# Patient Record
Sex: MALE | Race: BLACK OR AFRICAN AMERICAN
[De-identification: names, ages, dates, MRNs, and addresses within clinical notes are randomized per-mention and may not be internally consistent; named-entity substitution may affect disease eponyms.]

---

## 2018-01-05 ENCOUNTER — HOSPITAL ENCOUNTER (EMERGENCY)
Dept: HOSPITAL 45 - C.EDB | Age: 33
Discharge: HOME | End: 2018-01-05
Payer: COMMERCIAL

## 2018-01-05 VITALS — TEMPERATURE: 97.52 F

## 2018-01-05 VITALS
BODY MASS INDEX: 26.73 KG/M2 | BODY MASS INDEX: 26.73 KG/M2 | HEIGHT: 70.98 IN | WEIGHT: 190.92 LBS | WEIGHT: 190.92 LBS | HEIGHT: 70.98 IN

## 2018-01-05 VITALS — OXYGEN SATURATION: 97 % | DIASTOLIC BLOOD PRESSURE: 97 MMHG | HEART RATE: 67 BPM | SYSTOLIC BLOOD PRESSURE: 128 MMHG

## 2018-01-05 VITALS — OXYGEN SATURATION: 98 %

## 2018-01-05 DIAGNOSIS — Z86.718: ICD-10-CM

## 2018-01-05 DIAGNOSIS — F17.210: ICD-10-CM

## 2018-01-05 DIAGNOSIS — Z86.711: ICD-10-CM

## 2018-01-05 DIAGNOSIS — R55: Primary | ICD-10-CM

## 2018-01-05 DIAGNOSIS — Z79.01: ICD-10-CM

## 2018-01-05 LAB
ALBUMIN SERPL-MCNC: 3.8 GM/DL (ref 3.4–5)
ALP SERPL-CCNC: 129 U/L (ref 45–117)
ALT SERPL-CCNC: 42 U/L (ref 12–78)
AST SERPL-CCNC: 32 U/L (ref 15–37)
BASOPHILS # BLD: 0.03 K/UL (ref 0–0.2)
BASOPHILS NFR BLD: 0.4 %
BUN SERPL-MCNC: 20 MG/DL (ref 7–18)
CALCIUM SERPL-MCNC: 8.3 MG/DL (ref 8.5–10.1)
CK MB SERPL-MCNC: 2.9 NG/ML (ref 0.5–3.6)
CO2 SERPL-SCNC: 29 MMOL/L (ref 21–32)
CREAT SERPL-MCNC: 1.16 MG/DL (ref 0.6–1.4)
EOS ABS #: 0.2 K/UL (ref 0–0.5)
EOSINOPHIL NFR BLD AUTO: 239 K/UL (ref 130–400)
GLUCOSE SERPL-MCNC: 70 MG/DL (ref 70–99)
HCT VFR BLD CALC: 39.6 % (ref 42–52)
HGB BLD-MCNC: 14 G/DL (ref 14–18)
IG#: 0.03 K/UL (ref 0–0.02)
IMM GRANULOCYTES NFR BLD AUTO: 34.7 %
INR PPP: 2.7 (ref 0.9–1.1)
LYMPHOCYTES # BLD: 2.34 K/UL (ref 1.2–3.4)
MCH RBC QN AUTO: 30.4 PG (ref 25–34)
MCHC RBC AUTO-ENTMCNC: 35.4 G/DL (ref 32–36)
MCV RBC AUTO: 86.1 FL (ref 80–100)
MONO ABS #: 0.72 K/UL (ref 0.11–0.59)
MONOCYTES NFR BLD: 10.7 %
NEUT ABS #: 3.42 K/UL (ref 1.4–6.5)
NEUTROPHILS # BLD AUTO: 3 %
NEUTROPHILS NFR BLD AUTO: 50.8 %
PMV BLD AUTO: 9.3 FL (ref 7.4–10.4)
POTASSIUM SERPL-SCNC: 4.1 MMOL/L (ref 3.5–5.1)
PROT SERPL-MCNC: 7.4 GM/DL (ref 6.4–8.2)
RED CELL DISTRIBUTION WIDTH CV: 12.4 % (ref 11.5–14.5)
RED CELL DISTRIBUTION WIDTH SD: 39.4 FL (ref 36.4–46.3)
SODIUM SERPL-SCNC: 140 MMOL/L (ref 136–145)
WBC # BLD AUTO: 6.74 K/UL (ref 4.8–10.8)

## 2018-01-05 NOTE — EMERGENCY ROOM VISIT NOTE
History


Report prepared by Keiraibe:  Do Birch


Under the Supervision of:  Dr. Mando Gayle M.D.


First contact with patient:  19:04


Chief Complaint:  SYNCOPE


Stated Complaint:  DISORIENTED,PASSED OUT,TROUBLE BREATHING





History of Present Illness


The patient is a 32 year old male who presents to the Emergency Room with 

complaints of a syncopal episode that occurred earlier this evening. He states 

he was hanging out with friends [place] the [email or web address] 

this [place]. He went out to his car and reports he woke up "45 minutes later" 

unaware of what had happened. The patient states he started to feel dizzy and 

experienced abdominal pain before the episode occurred. He notes he did eat at 

[employer] earlier today. He denies sustaining and any injuries during the 

episode. He did experience some trouble breathing when he initially came too. 

He also complains of "hot flashes" in his arms for the past few weeks. The 

patient has a history of pulmonary embolism, found in August 2017, and is 

currently on Coumadin. He also complains of worsening anxiety recently and 

states he has been fatigued as he hasn't been sleeping well for the past few 

weeks.





   Source of History:  patient


   Onset:  PTA


   Position:  other (global)


   Timing:  resolved


   Associated Symptoms:  + LOC, + SOB, + abdominal pain, + fatigue





Review of Systems


See HPI for pertinent positives & negatives. A total of 10 systems reviewed and 

were otherwise negative.





Past Medical & Surgical


Medical Problems:


(1) Chest pain, pleuritic


(2) Pulmonary embolism


(3) Thrombosis in peripherally inserted central catheter (PICC)








Family History





FH: pulmonary embolism


Heart disease


Hypertension





Social History


Smoking Status:  Current Every Day Smoker


Alcohol Use:  heavy


Drug Use:  other


Marital Status:  in relationship


Housing Status:  lives with significant other


Occupation Status:  employed





Current/Historical Medications


Scheduled


Buprenorphine Hcl-Naloxone Hcl (Suboxone 8-2 Mg), 0.5 TAB PO DAILY


Gabapentin (Neurontin), 200 MG PO TID


Lactobacillus (Floranex), 4 TABS PO TID


Warfarin Sod (Coumadin), 5 MG PO DAILY@16





Allergies


Coded Allergies:  


     Fish (Verified  Allergy, Severe, ANAPHYLAXIS, 7/22/17)





Physical Exam


Vital Signs











  Date Time  Temp Pulse Resp B/P (MAP) Pulse Ox O2 Delivery O2 Flow Rate FiO2


 


1/5/18 20:09  67 20 128/97 97 Room Air  


 


1/5/18 19:29  67  144/84    





  71  143/89    





  72  141/87    


 


1/5/18 19:27     98 Room Air  


 


1/5/18 19:25  73      


 


1/5/18 18:55 36.4 70 18 145/95 100 Room Air  











Physical Exam


GENERAL: Patient is a healthy-appearing well-nourished 32 year old male 


HEAD: Normocephalic atraumatic


EYES: Ocular movements intact pupils equal and react to light


OROPHARYNX mucous membranes are moist no exudates present no erythema or edema 

present


NECK: Supple no nuchal rigidity


CHEST: Good equal expansion


LUNGS: Clear and equal to auscultation


CARDIAC: Normal S1 and S2


ABDOMEN: Soft nontender no guarding


BACK: No CVA tenderness


EXTREMITIES: No pain upon palpation normal muscle strength in all groups no 

clubbing cyanosis or edema


NEURO: Patient is following commands and answering questions appropriately. 

Alert and oriented x3 Cranial Nerves 2-12 grossly intact





Medical Decision & Procedures


ER Provider


Diagnostic Interpretation:


Radiology results as stated below per my review and radiologist interpretation:





CHEST ONE VIEW PORTABLE





CLINICAL HISTORY: Shortness of breath.    





COMPARISON STUDY:  Chest CT August 17, 2017 and chest radiograph August 25, 2017.





FINDINGS: Lung volumes are normal. No pneumothorax or pleural effusion is noted.


Pulmonary vascularity is normal. Cardiomediastinal silhouette is normal. No


consolidation is identified. 





IMPRESSION:  No acute cardiopulmonary findings. 





Electronically signed by:  Thor Hdez M.D.


1/5/2018 7:43 PM





Laboratory Results


1/5/18 19:23








Red Blood Count 4.60, Mean Corpuscular Volume 86.1, Mean Corpuscular Hemoglobin 

30.4, Mean Corpuscular Hemoglobin Concent 35.4, Mean Platelet Volume 9.3, 

Neutrophils (%) (Auto) 50.8, Lymphocytes (%) (Auto) 34.7, Monocytes (%) (Auto) 

10.7, Eosinophils (%) (Auto) 3.0, Basophils (%) (Auto) 0.4, Neutrophils # (Auto

) 3.42, Lymphocytes # (Auto) 2.34, Monocytes # (Auto) 0.72, Eosinophils # (Auto

) 0.20, Basophils # (Auto) 0.03





1/5/18 19:23

















Test


  1/5/18


19:21 1/5/18


19:23 1/5/18


19:46


 


Bedside Glucose


  83 mg/dl


(70-99) 


  


 


 


White Blood Count


  


  6.74 K/uL


(4.8-10.8) 


 


 


Red Blood Count


  


  4.60 M/uL


(4.7-6.1) 


 


 


Hemoglobin


  


  14.0 g/dL


(14.0-18.0) 


 


 


Hematocrit  39.6 % (42-52)  


 


Mean Corpuscular Volume


  


  86.1 fL


() 


 


 


Mean Corpuscular Hemoglobin


  


  30.4 pg


(25-34) 


 


 


Mean Corpuscular Hemoglobin


Concent 


  35.4 g/dl


(32-36) 


 


 


Platelet Count


  


  239 K/uL


(130-400) 


 


 


Mean Platelet Volume


  


  9.3 fL


(7.4-10.4) 


 


 


Neutrophils (%) (Auto)  50.8 %  


 


Lymphocytes (%) (Auto)  34.7 %  


 


Monocytes (%) (Auto)  10.7 %  


 


Eosinophils (%) (Auto)  3.0 %  


 


Basophils (%) (Auto)  0.4 %  


 


Neutrophils # (Auto)


  


  3.42 K/uL


(1.4-6.5) 


 


 


Lymphocytes # (Auto)


  


  2.34 K/uL


(1.2-3.4) 


 


 


Monocytes # (Auto)


  


  0.72 K/uL


(0.11-0.59) 


 


 


Eosinophils # (Auto)


  


  0.20 K/uL


(0-0.5) 


 


 


Basophils # (Auto)


  


  0.03 K/uL


(0-0.2) 


 


 


RDW Standard Deviation


  


  39.4 fL


(36.4-46.3) 


 


 


RDW Coefficient of Variation


  


  12.4 %


(11.5-14.5) 


 


 


Immature Granulocyte % (Auto)  0.4 %  


 


Immature Granulocyte # (Auto)


  


  0.03 K/uL


(0.00-0.02) 


 


 


Prothrombin Time


  


  28.1 SECONDS


(9.0-12.0) 


 


 


Prothromb Time International


Ratio 


  2.7 (0.9-1.1) 


  


 


 


Anion Gap


  


  5.0 mmol/L


(3-11) 


 


 


Est Creatinine Clear Calc


Drug Dose 


  95.5 ml/min 


  


 


 


Estimated GFR (


American) 


  94.7 


  


 


 


Estimated GFR (Non-


American 


  81.7 


  


 


 


BUN/Creatinine Ratio  16.9 (10-20)  


 


Calcium Level


  


  8.3 mg/dl


(8.5-10.1) 


 


 


Total Bilirubin


  


  0.3 mg/dl


(0.2-1) 


 


 


Direct Bilirubin


  


  < 0.1 mg/dl


(0-0.2) 


 


 


Aspartate Amino Transf


(AST/SGOT) 


  32 U/L (15-37) 


  


 


 


Alanine Aminotransferase


(ALT/SGPT) 


  42 U/L (12-78) 


  


 


 


Alkaline Phosphatase


  


  129 U/L


() 


 


 


Total Creatine Kinase


  


  498 U/L


() 


 


 


Creatine Kinase MB


  


  2.9 ng/ml


(0.5-3.6) 


 


 


Creatine Kinase MB Ratio  0.6 (0-3.0)  


 


Troponin I


  


  < 0.015 ng/ml


(0-0.045) 


 


 


Total Protein


  


  7.4 gm/dl


(6.4-8.2) 


 


 


Albumin


  


  3.8 gm/dl


(3.4-5.0) 


 


 


Thyroid Stimulating Hormone


(TSH) 


  1.710 uIu/ml


(0.300-4.500) 


 


 


Urine Color   YELLOW 


 


Urine Appearance   CLEAR (CLEAR) 


 


Urine pH   6.0 (4.5-7.5) 


 


Urine Specific Gravity


  


  


  1.020


(1.000-1.030)


 


Urine Protein   NEG (NEG) 


 


Urine Glucose (UA)   NEG (NEG) 


 


Urine Ketones   NEG (NEG) 


 


Urine Occult Blood   NEG (NEG) 


 


Urine Nitrite   NEG (NEG) 


 


Urine Bilirubin   NEG (NEG) 


 


Urine Urobilinogen   NEG (NEG) 


 


Urine Leukocyte Esterase   TRACE (NEG) 


 


Urine WBC (Auto)   1-5 /hpf (0-5) 


 


Urine RBC (Auto)   0-4 /hpf (0-4) 


 


Urine Hyaline Casts (Auto)   1-5 /lpf (0-5) 


 


Urine Epithelial Cells (Auto)


  


  


  5-10 /lpf


(0-5)


 


Urine Bacteria (Auto)   NEG (NEG) 





Labs reviewed by ED physician.





ECG


Indication:  syncope


Rate (beats per minute):  62


Rhythm:  normal sinus


Findings:  T-wave inversion (Inferior)


Change:  no significant change (No change from 8/17/17)





ED Course


1906: Past medical records reviewed. The patient was evaluated in room C9. A 

complete history and physical examination was performed. 





2009: I reevaluated the patient. He is resting comfortably. I discussed his 

results and discharge instructions and he verbalized complete understanding and 

agreement.





Medical Decision


Prior records/ancillary studies reviewed.


Triage Nursing notes reviewed.





The patient's history was concerning for syncope.





Differential diagnosis:


Etiologies such as vasovagal event, infection, hypoglycemia, electrolyte 

abnormalities, cardiac sources, intracerebral event, toxicologic, neurologic, 

as well as others were entertained.





This is a 32-year-old male who presents emergency department complaining of 

shortness of a syncopal episode.  The patient has a history of PE however he 

denies any chest pain.  In addition he is not tachycardic.  His INR level is 

therapeutic at 2.7.  Based on these findings my suspicion of a PE is 

exceedingly low.  The patient feels that he has had an anxiety attack.  This 

would certainly fit with his complaints of numbness and tingling in his arms 

and legs.  His normal CBC normal renal profile normal liver profile.  His EKG 

is unchanged from previous.  Based on these findings I feel the patient can be 

safely discharged home for follow-up with his primary care physician.  Patient 

was in agreement with the treatment plan.





Medication Reconcilliation


Current Medication List:  was personally reviewed by me





Blood Pressure Screening


Patient's blood pressure:  Elevated blood pressure


Blood pressure disposition:  Referred to PCP





Impression





 Primary Impression:  


 Vasovagal syncope


 Additional Impression:  


 Syncope





Scribe Attestation


The scribe's documentation has been prepared under my direction and personally 

reviewed by me in its entirety. I confirm that the note above accurately 

reflects all work, treatment, procedures, and medical decision making performed 

by me.





Departure Information


Dispostion


Home / Self-Care





Referrals


No Doctor, Assigned (PCP)





Patient Instructions


Anxiety Body Response, ED Syncope Vasovagal, My [facility]





Additional Instructions





Increase fluids next 48 hours














You have been examined and treated today on an emergency basis only. This is 

not a substitute for, or an effort to provide, complete comprehensive medical 

care. It is impossible to recognize and treat all injuries or illnesses in a 

single emergency department visit. It is therefore important that you follow up 

closely with your PCP.  Call as soon as possible for an appointment.  





Thank you for your time and consideration.  I look forward to speaking with you 

again soon.  Please don't hesitate to call us if you have any questions.





Problem Qualifiers








 Additional Impression:  


 Syncope


 Syncope type:  vasovagal syncope  Qualified Codes:  R55 - Syncope and collapse

## 2018-01-05 NOTE — DIAGNOSTIC IMAGING REPORT
CHEST ONE VIEW PORTABLE



CLINICAL HISTORY: Shortness of breath.    



COMPARISON STUDY:  Chest CT August 17, 2017 and chest radiograph August 25, 2017.



FINDINGS: Lung volumes are normal. No pneumothorax or pleural effusion is noted.

Pulmonary vascularity is normal. Cardiomediastinal silhouette is normal. No

consolidation is identified. 



IMPRESSION:  No acute cardiopulmonary findings. 









Electronically signed by:  Thor Hdez M.D.

1/5/2018 7:43 PM



Dictated Date/Time:  1/5/2018 7:42 PM

## 2018-01-13 ENCOUNTER — HOSPITAL ENCOUNTER (EMERGENCY)
Dept: HOSPITAL 45 - C.EDB | Age: 33
Discharge: HOME | End: 2018-01-13
Payer: COMMERCIAL

## 2018-01-13 VITALS — OXYGEN SATURATION: 99 %

## 2018-01-13 VITALS
BODY MASS INDEX: 27.31 KG/M2 | HEIGHT: 70.98 IN | WEIGHT: 195.11 LBS | HEIGHT: 70.98 IN | BODY MASS INDEX: 27.31 KG/M2 | WEIGHT: 195.11 LBS

## 2018-01-13 VITALS — DIASTOLIC BLOOD PRESSURE: 85 MMHG | HEART RATE: 71 BPM | SYSTOLIC BLOOD PRESSURE: 128 MMHG | OXYGEN SATURATION: 100 %

## 2018-01-13 VITALS — TEMPERATURE: 97.88 F

## 2018-01-13 DIAGNOSIS — Z86.718: ICD-10-CM

## 2018-01-13 DIAGNOSIS — J18.1: Primary | ICD-10-CM

## 2018-01-13 DIAGNOSIS — Z79.899: ICD-10-CM

## 2018-01-13 DIAGNOSIS — F17.210: ICD-10-CM

## 2018-01-13 DIAGNOSIS — Z86.711: ICD-10-CM

## 2018-01-13 DIAGNOSIS — R06.02: ICD-10-CM

## 2018-01-13 DIAGNOSIS — Z79.01: ICD-10-CM

## 2018-01-13 DIAGNOSIS — Z82.49: ICD-10-CM

## 2018-01-13 LAB
ALBUMIN SERPL-MCNC: 3.8 GM/DL (ref 3.4–5)
ALP SERPL-CCNC: 136 U/L (ref 45–117)
ALT SERPL-CCNC: 51 U/L (ref 12–78)
AST SERPL-CCNC: 32 U/L (ref 15–37)
BASOPHILS # BLD: 0.02 K/UL (ref 0–0.2)
BASOPHILS NFR BLD: 0.2 %
BUN SERPL-MCNC: 13 MG/DL (ref 7–18)
CALCIUM SERPL-MCNC: 8.6 MG/DL (ref 8.5–10.1)
CO2 SERPL-SCNC: 27 MMOL/L (ref 21–32)
CREAT SERPL-MCNC: 1.01 MG/DL (ref 0.6–1.4)
EOS ABS #: 0.21 K/UL (ref 0–0.5)
EOSINOPHIL NFR BLD AUTO: 248 K/UL (ref 130–400)
GLUCOSE SERPL-MCNC: 105 MG/DL (ref 70–99)
HCT VFR BLD CALC: 37.9 % (ref 42–52)
HGB BLD-MCNC: 13.8 G/DL (ref 14–18)
IG#: 0.03 K/UL (ref 0–0.02)
IMM GRANULOCYTES NFR BLD AUTO: 16.9 %
INR PPP: 3.4 (ref 0.9–1.1)
LYMPHOCYTES # BLD: 2.11 K/UL (ref 1.2–3.4)
MCH RBC QN AUTO: 31 PG (ref 25–34)
MCHC RBC AUTO-ENTMCNC: 36.4 G/DL (ref 32–36)
MCV RBC AUTO: 85.2 FL (ref 80–100)
MONO ABS #: 1.68 K/UL (ref 0.11–0.59)
MONOCYTES NFR BLD: 13.5 %
NEUT ABS #: 8.43 K/UL (ref 1.4–6.5)
NEUTROPHILS # BLD AUTO: 1.7 %
NEUTROPHILS NFR BLD AUTO: 67.5 %
PMV BLD AUTO: 9.7 FL (ref 7.4–10.4)
POTASSIUM SERPL-SCNC: 3.8 MMOL/L (ref 3.5–5.1)
PROT SERPL-MCNC: 7.4 GM/DL (ref 6.4–8.2)
PTT PATIENT: 43.1 SECONDS (ref 21–31)
RED CELL DISTRIBUTION WIDTH CV: 12.4 % (ref 11.5–14.5)
RED CELL DISTRIBUTION WIDTH SD: 38.5 FL (ref 36.4–46.3)
SODIUM SERPL-SCNC: 140 MMOL/L (ref 136–145)
WBC # BLD AUTO: 12.48 K/UL (ref 4.8–10.8)

## 2018-01-13 NOTE — DIAGNOSTIC IMAGING REPORT
(CHEST FOR PE) ANGIO WITH



CT DOSE: 360.00 mGy.cm



HISTORY: Chest pain  dyspnea



TECHNIQUE: Multiaxial CT images of the chest were performed following the

intravenous administration of contrast to evaluate the pulmonary arteries.

Maximal intensity projection images were also obtained.  A dose lowering

technique was utilized adhering to the principles of ALARA.





COMPARISON STUDY: 8/17/2017



FINDINGS: There is a normal caliber thoracic aorta with no evidence for

dissection. There is no evidence for pulmonary embolus. No pleural effusions. No

pneumothorax. The liver and spleen are unremarkable. No mediastinal or hilar

lymphadenopathy. The central airways are patent. The lungs demonstrate minimal

interstitial infiltrative change right pulmonary apex.



IMPRESSION: 

No evidence for pulmonary embolus. Minimal interstitial infiltrate right

pulmonary apex.











The above report was generated using voice recognition software.  It may contain

grammatical, syntax or spelling errors.







Electronically signed by:  Sae Tello M.D.

1/13/2018 3:26 PM



Dictated Date/Time:  1/13/2018 3:21 PM

## 2018-01-13 NOTE — DIAGNOSTIC IMAGING REPORT
CHEST ONE VIEW PORTABLE



CLINICAL HISTORY: EVALUATE RESPIRATORY DISTRESS.DYSPNEA dyspnea



COMPARISON STUDY:  1/5/2018



FINDINGS: The bones soft tissues and hemidiaphragms are normal. The

cardiomediastinal silhouette is normal. The lungs are clear. The pulmonary

vasculature is normal. 



IMPRESSION:  Negative chest. 











The above report was generated using voice recognition software.  It may contain

grammatical, syntax or spelling errors.









Electronically signed by:  Sae Tello M.D.

1/13/2018 1:22 PM



Dictated Date/Time:  1/13/2018 1:22 PM

## 2018-01-13 NOTE — DIAGNOSTIC IMAGING REPORT
VENOUS DOPPLER LWR EXT BILA



HISTORY: Pain. Edema.  h/o PE, SOB, mild b/l LE swelling at ankles



COMPARISON STUDY:  7/27/2017



FINDINGS: There is normal compressibility, flow, and augmentation within the

bilateral lower extremity deep venous systems.



IMPRESSION:  

No DVT within the right or left lower extremity.









The above report was generated using voice recognition software.  It may contain

grammatical, syntax or spelling errors.







Electronically signed by:  Sae Tello M.D.

1/13/2018 3:14 PM



Dictated Date/Time:  1/13/2018 3:11 PM

## 2018-01-13 NOTE — EMERGENCY ROOM VISIT NOTE
History


Report prepared by Osiris:  Brenda Monk


Under the Supervision of:  Dr. Emilio Solo M.D.


First contact with patient:  12:43


Chief Complaint:  OTHER COMPLAINT


Stated Complaint:  PULMONARY EMBOLISM,PT HAS HX OF,SWOLLEN FEET





History of Present Illness


The patient is a 32 year old black male with a past medical history of PE and 

anxiety who presents to the ED with a cc of leg pain beginning 8 days ago. The 

patient states that he was admitted to the hospital recently for a PE. He 

reports that he has been taking Coumadin since his visit. Over the past few 

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 

cleaning. Positive shortness of breath, left arm pain, foot swelling and foot 

pain. Negative recent trauma. The patient states that he has not been sleeping 

well in the past 2 days. He reports that he is a smoker and drank last over 6 

months ago.





   Source of History:  patient


   Onset:  8 days ago


   Position:  leg


   Timing:  constant


   Associated Symptoms:  + SOB


Note:


Positive foot swelling and pain. Negative trauma.





Review of Systems


See HPI for pertinent positives and negatives.  A total of ten systems were 

reviewed and were otherwise negative.





Past Medical & Surgical


Medical Problems:


(1) Chest pain, pleuritic


(2) Pulmonary embolism


(3) Thrombosis in peripherally inserted central catheter (PICC)








Family History





FH: pulmonary embolism


Heart disease


Hypertension





Social History


Smoking Status:  Current Every Day Smoker


Alcohol Use:  heavy


Drug Use:  other


Marital Status:  in relationship


Housing Status:  lives with significant other


Occupation Status:  employed





Current/Historical Medications


Scheduled


Azithromycin (Zithromax), 250 MG PO DAILY


Buprenorphine Hcl-Naloxone Hcl (Suboxone 8-2 Mg), 0.5 DOSE SL DAILY


Gabapentin (Neurontin), 200 MG PO TID


Lactobacillus (Floranex), 4 TABS PO TID


Warfarin Sod (Coumadin), 5 MG PO DAILY@16





Allergies


Coded Allergies:  


     Shellfish (Verified  Allergy, Unknown, ANAPHYLAXIS, 1/13/18)





Physical Exam


Vital Signs











  Date Time  Temp Pulse Resp B/P (MAP) Pulse Ox O2 Delivery O2 Flow Rate FiO2


 


1/13/18 15:30  71 15 128/85 100 Room Air  


 


1/13/18 14:39  78 14 128/77 97 Room Air  


 


1/13/18 13:43  89      


 


1/13/18 13:39  98 17 142/76 99 Room Air  


 


1/13/18 13:39     99 Room Air  


 


1/13/18 12:30 36.6 108 20 149/83 100 Room Air  











Physical Exam


GENERAL: Awake, alert, well-appearing, NAD


HENT: Normocephalic, atraumatic.


EYES: Normal conjunctiva. Sclera non-icteric.


NECK: Supple. No nuchal rigidity. FROM.


RESPIRATORY: CTAB, no rhonchi, wheezing, crackles


CARDIAC: RRR, no MRG


ABDOMEN: Soft, NTND, BS+


MSK: No chest wall TTP,  non pitting edema around the ankles, no warmth noted, 

compartments soft. NVI distally


NEURO: GCS 15, CN 2-12 intact, moves all 4s on command


SKIN: No rash or jaundice noted.





Medical Decision & Procedures


ER Provider


Diagnostic Interpretation:


Radiology results as stated below per my review and radiologist interpretation: 





VENOUS DOPPLER LWR EXT BILA





IMPRESSION:  


No DVT within the right or left lower extremity.





The above report was generated using voice recognition software.  It may contain


grammatical, syntax or spelling errors





Electronically signed by:  Sae Tello M.D.


1/13/2018 3:14 PM





Dictated Date/Time:  1/13/2018 3:11 PM





(CHEST FOR PE) ANGIO WITH





COMPARISON STUDY: 8/17/2017





FINDINGS: There is a normal caliber thoracic aorta with no evidence for


dissection. There is no evidence for pulmonary embolus. No pleural effusions. No


pneumothorax. The liver and spleen are unremarkable. No mediastinal or hilar


lymphadenopathy. The central airways are patent. The lungs demonstrate minimal


interstitial infiltrative change right pulmonary apex.





IMPRESSION: 


No evidence for pulmonary embolus. Minimal interstitial infiltrate right


pulmonary apex.





The above report was generated using voice recognition software.  It may contain


grammatical, syntax or spelling errors.





Electronically signed by:  Sae Tello M.D.


1/13/2018 3:26 PM





Dictated Date/Time:  1/13/2018 3:21 PM





CHEST ONE VIEW PORTABLE





FINDINGS: The bones soft tissues and hemidiaphragms are normal. The


cardiomediastinal silhouette is normal. The lungs are clear. The pulmonary


vasculature is normal. 





IMPRESSION:  Negative chest. 





The above report was generated using voice recognition software.  It may contain


grammatical, syntax or spelling errors.





Electronically signed by:  Sae Tello M.D.


1/13/2018 1:22 PM





Dictated Date/Time:  1/13/2018 1:22 PM





Laboratory Results


1/13/18 12:45








Red Blood Count 4.45, Mean Corpuscular Volume 85.2, Mean Corpuscular Hemoglobin 

31.0, Mean Corpuscular Hemoglobin Concent 36.4, Mean Platelet Volume 9.7, 

Neutrophils (%) (Auto) 67.5, Lymphocytes (%) (Auto) 16.9, Monocytes (%) (Auto) 

13.5, Eosinophils (%) (Auto) 1.7, Basophils (%) (Auto) 0.2, Neutrophils # (Auto

) 8.43, Lymphocytes # (Auto) 2.11, Monocytes # (Auto) 1.68, Eosinophils # (Auto

) 0.21, Basophils # (Auto) 0.02





1/13/18 12:45

















Test


  1/13/18


12:45


 


White Blood Count


  12.48 K/uL


(4.8-10.8)


 


Red Blood Count


  4.45 M/uL


(4.7-6.1)


 


Hemoglobin


  13.8 g/dL


(14.0-18.0)


 


Hematocrit 37.9 % (42-52) 


 


Mean Corpuscular Volume


  85.2 fL


()


 


Mean Corpuscular Hemoglobin


  31.0 pg


(25-34)


 


Mean Corpuscular Hemoglobin


Concent 36.4 g/dl


(32-36)


 


Platelet Count


  248 K/uL


(130-400)


 


Mean Platelet Volume


  9.7 fL


(7.4-10.4)


 


Neutrophils (%) (Auto) 67.5 % 


 


Lymphocytes (%) (Auto) 16.9 % 


 


Monocytes (%) (Auto) 13.5 % 


 


Eosinophils (%) (Auto) 1.7 % 


 


Basophils (%) (Auto) 0.2 % 


 


Neutrophils # (Auto)


  8.43 K/uL


(1.4-6.5)


 


Lymphocytes # (Auto)


  2.11 K/uL


(1.2-3.4)


 


Monocytes # (Auto)


  1.68 K/uL


(0.11-0.59)


 


Eosinophils # (Auto)


  0.21 K/uL


(0-0.5)


 


Basophils # (Auto)


  0.02 K/uL


(0-0.2)


 


RDW Standard Deviation


  38.5 fL


(36.4-46.3)


 


RDW Coefficient of Variation


  12.4 %


(11.5-14.5)


 


Immature Granulocyte % (Auto) 0.2 % 


 


Immature Granulocyte # (Auto)


  0.03 K/uL


(0.00-0.02)


 


Prothrombin Time


  35.3 SECONDS


(9.0-12.0)


 


Prothromb Time International


Ratio 3.4 (0.9-1.1) 


 


 


Activated Partial


Thromboplast Time 43.1 SECONDS


(21.0-31.0)


 


Partial Thromboplastin Ratio 1.7 


 


Anion Gap


  7.0 mmol/L


(3-11)


 


Est Creatinine Clear Calc


Drug Dose 109.7 ml/min 


 


 


Estimated GFR (


American) 112.0 


 


 


Estimated GFR (Non-


American 96.6 


 


 


BUN/Creatinine Ratio 12.9 (10-20) 


 


Calcium Level


  8.6 mg/dl


(8.5-10.1)


 


Total Bilirubin


  0.3 mg/dl


(0.2-1)


 


Aspartate Amino Transf


(AST/SGOT) 32 U/L (15-37) 


 


 


Alanine Aminotransferase


(ALT/SGPT) 51 U/L (12-78) 


 


 


Alkaline Phosphatase


  136 U/L


()


 


Troponin I


  < 0.015 ng/ml


(0-0.045)


 


Pro-B-Type Natriuretic Peptide


  7 pg/ml


(0-450)


 


Total Protein


  7.4 gm/dl


(6.4-8.2)


 


Albumin


  3.8 gm/dl


(3.4-5.0)


 


Globulin


  3.6 gm/dl


(2.5-4.0)


 


Albumin/Globulin Ratio 1.0 (0.9-2) 





Laboratory results reviewed by me





Medications Administered











 Medications


  (Trade)  Dose


 Ordered  Sig/Jae


 Route  Start Time


 Stop Time Status Last Admin


Dose Admin


 


 Azithromycin


  (Zithromax Tab)  500 mg  NOW  ONCE


 PO  1/13/18 15:45


 1/13/18 15:46 DC 1/13/18 15:59


500 MG











ECG


Indication:  SOB/dyspnea


Rate (beats per minute):  92


Rhythm:  normal sinus


Findings:  T-wave inversion (lead 3), other (normal axis, normal intervals, no 

other TWI or STS changes)





ED Course


1243: The patient was evaluated in room B3. A complete history and physical 

exam was performed.





1602: I reevaluated and updated the patient. The patient is going to discuss 

his anxiety with a  before going home. 





I reevaluated the patient. Discussed results and discharge instructions: He 

verbalized understanding and agreement. The patient is ready for discharge.





Medical Decision


The patient is a 32 year old black male with a past medical history of PE and 

anxiety who presents to the ED with a cc of leg pain beginning 8 days ago. 





Differential diagnosis:


Etiologies such as anxiety, infections, reactive airway disease, pneumonia, 

pneumothorax, COPD, CHF, cardiac ischemia, pulmonary embolism, musculoskeletal, 

gastrointestinal, as well as others were entertained.





Patient seen and evaluated at the bedside.  Patient is a prior history of PE 

and DVTs.  Also is a prior history of alcohol in drug abuse in the past.  

Patient denies any recent alcohol or drug abuse.  States he has not had a drink 

in 6 months.  Patient was admitted for PE discharge on Coumadin back in August.

  Patient states been compliant with his medications.  Patient has noticed some 

bilateral lower extremity swelling as well as some shortness of breath.  

Patient denies any cough.  Of note patient is a smoker.  Patient denies any 

hemoptysis or recent prolonged car or plane travel.  Patient did have blood work

, EKG, troponin, BNP, CT PE.  Patient's EKG nonischemic without noted right 

heart strain.  Patient troponin negative.  BNP is not elevated.  Patient chest x

-ray clear. B/l LE dopplers performed. 





Patient's CT PE or PE protocol negative for PEs.  Patient does have crushable 

interstitial infiltrate in the right apex.  Patient was given azithromycin.  

Bilateral lower extremity Dopplers were negative for acute DVT.  Patient's 

other blood work was fairly unremarkable.  Given the patient's white blood cell 

count of 12,000 with his interstitial infiltrate patient was given antibiotics.

  Patient was counseled on smoking cessation.  Patient was having some anxiety 

patient was given some outpatient resources via the site .  Patient 

was deemed suitable for outpatient follow-up and treatment at this time.Patient 

was given strict follow-up, discharge, and return precautions.  All questions 

were answered.  Patient was deemed suitable for outpatient follow-up at this 

time.  Patient agreed with the plan of care and was safely discharged home.





The chart was completed utilizing Dragon Speech voice recognition software. 

Grammatical errors, random word insertions, pronoun errors, and incomplete 

sentences are an occasional consequence of this system due to software 

limitations, ambient noise, and hardware issues. Any formal questions or 

concerns about the content, text, or information contained within the body of 

this dictation should be directly addressed to the physician for clarification.





Medication Reconcilliation


Current Medication List:  was personally reviewed by me





Blood Pressure Screening


Patient's blood pressure:  Elevated blood pressure


Blood pressure disposition:  Elevated BP felt to be situational





Impression





 Primary Impression:  


 Pneumonia


 Additional Impression:  


 SOB (shortness of breath)





Scribe Attestation


The scribe's documentation has been prepared under my direction and personally 

reviewed by me in its entirety. I confirm that the note above accurately 

reflects all work, treatment, procedures, and medical decision making performed 

by me.





Departure Information


Dispostion


Home / Self-Care





Prescriptions





Azithromycin (Zithromax) 250 Mg Tab


250 MG PO DAILY for 4 Days, #4 TAB


   Prov: Emilio Solo M.D.         1/13/18





Referrals


No Doctor, Assigned (PCP)





Patient Instructions


ED Smoking Cessation, [facility], Pneumonia





Additional Instructions





Please return to the emergency department if you have worsening or recurrent 

symptoms not amenable to at-home treatment.  Please call for a follow-up 

appointment with her primary care physician.  Please take your medications as 

prescribed.  If you have other concerns and/or complaints please feel free to 

also call your primary care physician's office or return the ED for further 

evaluation, management, and treatment.





You may take tylenol 1000 mg every 6 hours as needed for pain.





Consider smoking cessation.





Take your medications as prescribed. If taking an antibiotic consider taking a 

probiotic and/or eating yogurt, but at the least, please take with food as it 

can cause upset stomach.





Please take your medications as prescribed.  Please also follow up with the 

Coumadin clinic to make sure that you're Coumadin levels are normal.  They were 

mildly elevated today at 3.4.  If he did take 10 mg per day please try taking 

7.5 mg for your next dose.





You have been examined and treated today on an emergency basis only. This is 

not a substitute for, or an effort to provide, complete comprehensive medical 

care. It is impossible to recognize and treat all injuries or illnesses in a 

single emergency department visit. It is therefore important that you follow up 

closely with [facility], your PCP, and/or your specialist(s). 

Call as soon as possible for an appointment.





Thank you for your time and consideration. I look forward to speaking with you 

again soon. Please don't hesitate to call us if you have any questions.





Problem Qualifiers








 Primary Impression:  


 Pneumonia


 Pneumonia type:  due to unspecified organism  Laterality:  right  Lung location

:  upper lobe of lung  Qualified Codes:  J18.1 - Lobar pneumonia, unspecified 

organism

## 2018-04-15 ENCOUNTER — HOSPITAL ENCOUNTER (EMERGENCY)
Dept: HOSPITAL 45 - C.EDB | Age: 33
Discharge: HOME | End: 2018-04-15
Payer: COMMERCIAL

## 2018-04-15 VITALS
WEIGHT: 209.22 LBS | BODY MASS INDEX: 29.29 KG/M2 | BODY MASS INDEX: 29.29 KG/M2 | WEIGHT: 209.22 LBS | HEIGHT: 70.98 IN | HEIGHT: 70.98 IN

## 2018-04-15 VITALS — OXYGEN SATURATION: 95 % | SYSTOLIC BLOOD PRESSURE: 145 MMHG | DIASTOLIC BLOOD PRESSURE: 87 MMHG | HEART RATE: 91 BPM

## 2018-04-15 VITALS — TEMPERATURE: 98.6 F

## 2018-04-15 DIAGNOSIS — R60.0: ICD-10-CM

## 2018-04-15 DIAGNOSIS — F17.200: ICD-10-CM

## 2018-04-15 DIAGNOSIS — Z91.013: ICD-10-CM

## 2018-04-15 DIAGNOSIS — Z79.01: ICD-10-CM

## 2018-04-15 DIAGNOSIS — T85.868A: ICD-10-CM

## 2018-04-15 DIAGNOSIS — E86.0: Primary | ICD-10-CM

## 2018-04-15 DIAGNOSIS — Z51.81: ICD-10-CM

## 2018-04-15 LAB
ALBUMIN SERPL-MCNC: 3.6 GM/DL (ref 3.4–5)
ALP SERPL-CCNC: 158 U/L (ref 45–117)
ALT SERPL-CCNC: 64 U/L (ref 12–78)
AST SERPL-CCNC: 61 U/L (ref 15–37)
BASOPHILS # BLD: 0.01 K/UL (ref 0–0.2)
BASOPHILS NFR BLD: 0.1 %
BUN SERPL-MCNC: 12 MG/DL (ref 7–18)
CALCIUM SERPL-MCNC: 8.5 MG/DL (ref 8.5–10.1)
CO2 SERPL-SCNC: 28 MMOL/L (ref 21–32)
CREAT SERPL-MCNC: 1.18 MG/DL (ref 0.6–1.4)
EOS ABS #: 0.09 K/UL (ref 0–0.5)
EOSINOPHIL NFR BLD AUTO: 211 K/UL (ref 130–400)
GLUCOSE SERPL-MCNC: 104 MG/DL (ref 70–99)
HCT VFR BLD CALC: 37.1 % (ref 42–52)
HGB BLD-MCNC: 13.5 G/DL (ref 14–18)
IG#: 0.04 K/UL (ref 0–0.02)
IMM GRANULOCYTES NFR BLD AUTO: 12.7 %
INR PPP: 1.5 (ref 0.9–1.1)
LIPASE: 71 U/L (ref 73–393)
LYMPHOCYTES # BLD: 1.16 K/UL (ref 1.2–3.4)
MCH RBC QN AUTO: 30.9 PG (ref 25–34)
MCHC RBC AUTO-ENTMCNC: 36.4 G/DL (ref 32–36)
MCV RBC AUTO: 84.9 FL (ref 80–100)
MONO ABS #: 1.36 K/UL (ref 0.11–0.59)
MONOCYTES NFR BLD: 14.9 %
NEUT ABS #: 6.46 K/UL (ref 1.4–6.5)
NEUTROPHILS # BLD AUTO: 1 %
NEUTROPHILS NFR BLD AUTO: 70.9 %
PMV BLD AUTO: 9 FL (ref 7.4–10.4)
POTASSIUM SERPL-SCNC: 3.8 MMOL/L (ref 3.5–5.1)
PROT SERPL-MCNC: 7.3 GM/DL (ref 6.4–8.2)
PTT PATIENT: 36.9 SECONDS (ref 21–31)
RED CELL DISTRIBUTION WIDTH CV: 11.6 % (ref 11.5–14.5)
RED CELL DISTRIBUTION WIDTH SD: 36.3 FL (ref 36.4–46.3)
SODIUM SERPL-SCNC: 138 MMOL/L (ref 136–145)
WBC # BLD AUTO: 9.12 K/UL (ref 4.8–10.8)

## 2018-04-15 NOTE — EMERGENCY ROOM VISIT NOTE
History


Report prepared by Osiris:  Siva Ulloa


Under the Supervision of:  Dr. Emilio Solo M.D.


First contact with patient:  18:03


Chief Complaint:  PAIN (GENERALIZED)


Stated Complaint:  COULNT MOVE, PAIN IN ARM, FOOT TO THIGH SWOLLEN





History of Present Illness


The patient is a 32 year old black male with a past medical history of 

pulmonary embolisms who presents to the ED with a cc of worsening swelling to 

his lower extremities beginning three months ago. Pt states his swelling 

started in his bilateral feet and has now moved up his legs three days ago. 

Positive shortness of breath, right lower back pain that woke him up this 

morning, constant bloating, nausea, short term memory problems, and taking 

Coumadin. Negative vomiting and missing a dose of his Coumadin. Review of EMR 

showed the patient was evaluated three months ago and had a negative CT and 

bilateral lower extremity Doppler ultrasounds. It reports he was also evaluated 

for foot and ankle swelling a month ago and had another negative Doppler 

ultrasound. EMR notes he is being treated for cellulitis.





   Source of History:  patient


   Onset:  three months ago


   Position:  other (bilateral lower extremities)


   Quality:  other (swelling)


   Timing:  worsening


   Associated Symptoms:  + SOB, + nausea, + back pain (right lower), No vomiting


Note:


Associated symptoms: constant bloating, short term memory problems





Review of Systems


See HPI for pertinent positives and negatives.  A total of ten systems were 

reviewed and were otherwise negative.





Past Medical & Surgical


Medical Problems:


(1) Chest pain, pleuritic


(2) Pulmonary embolism


(3) Thrombosis in peripherally inserted central catheter (PICC)








Family History





FH: pulmonary embolism


Heart disease


Hypertension





Social History


Smoking Status:  Current Every Day Smoker


Alcohol Use:  heavy


Drug Use:  other


Marital Status:  in relationship


Housing Status:  lives with significant other


Occupation Status:  employed





Current/Historical Medications


Scheduled


Amphetamine-Dextroamphetamine 20MG (Adderall 20MG), 20 MG PO DAILY


Buprenorphine Hcl-Naloxone Hcl (Suboxone 8-2 Mg), Unknown Dose SL DAILY


Escitalopram (Lexapro), 10 MG PO DAILY


Gabapentin (Neurontin), 200 MG PO TID


Probiotic Product (Probiotic), 1 CAP PO TID


Warfarin Sodium (Coumadin), 5 MG PO DAILY AT 1600





Allergies


Coded Allergies:  


     Shellfish (Verified  Allergy, Severe, ANAPHYLAXIS, 4/15/18)





Physical Exam


Vital Signs











  Date Time  Temp Pulse Resp B/P (MAP) Pulse Ox O2 Delivery O2 Flow Rate FiO2


 


4/15/18 20:00  91 20 145/87 95 Room Air  


 


4/15/18 18:30  109 17 137/78 99 Room Air  


 


4/15/18 18:14      Room Air  


 


4/15/18 18:13  119      


 


4/15/18 17:58 37.0 125 20 141/80 97 Room Air  











Physical Exam


GENERAL: Awake, alert, well-appearing, NAD


HENT: Normocephalic, atraumatic.


EYES: Normal conjunctiva. Sclera non-icteric.


NECK: Supple. No nuchal rigidity. FROM.


RESPIRATORY: CTAB, no rhonchi, wheezing, crackles


CARDIAC: Tachy and regular, no MRG


ABDOMEN: Soft, ND, BS+, reproducible right sided abdominal pain.


MSK: No chest wall TTP, 1+ bilateral LE edema up to the mild chin.


NEURO: GCS 15, CN 2-12 intact, moves all 4s on command


SKIN: No rash or jaundice noted.





Medical Decision & Procedures


ER Provider


Diagnostic Interpretation:


X-ray: Per my interpretation, radiologist review. 





CHEST ONE VIEW PORTABLE





CLINICAL HISTORY: 32 years-old Male presenting with CHEST PAIN, swollen leg. 





TECHNIQUE: Portable upright AP view of the chest was obtained.





COMPARISON: 1/13/2018.





FINDINGS:


Cardiomediastinal silhouette normal. Lungs and pleural spaces clear. Osseous


structures normal. Upper abdomen normal.





IMPRESSION:


1.  No acute cardiopulmonary disease.





Electronically signed by:  Kurt Nam M.D.


4/15/2018 6:47 PM





Dictated Date/Time:  4/15/2018 6:46 PM





Laboratory Results


4/15/18 18:14








Red Blood Count 4.37, Mean Corpuscular Volume 84.9, Mean Corpuscular Hemoglobin 

30.9, Mean Corpuscular Hemoglobin Concent 36.4, Mean Platelet Volume 9.0, 

Neutrophils (%) (Auto) 70.9, Lymphocytes (%) (Auto) 12.7, Monocytes (%) (Auto) 

14.9, Eosinophils (%) (Auto) 1.0, Basophils (%) (Auto) 0.1, Neutrophils # (Auto

) 6.46, Lymphocytes # (Auto) 1.16, Monocytes # (Auto) 1.36, Eosinophils # (Auto

) 0.09, Basophils # (Auto) 0.01





4/15/18 18:14

















Test


  4/15/18


18:14


 


White Blood Count


  9.12 K/uL


(4.8-10.8)


 


Red Blood Count


  4.37 M/uL


(4.7-6.1)


 


Hemoglobin


  13.5 g/dL


(14.0-18.0)


 


Hematocrit 37.1 % (42-52) 


 


Mean Corpuscular Volume


  84.9 fL


()


 


Mean Corpuscular Hemoglobin


  30.9 pg


(25-34)


 


Mean Corpuscular Hemoglobin


Concent 36.4 g/dl


(32-36)


 


Platelet Count


  211 K/uL


(130-400)


 


Mean Platelet Volume


  9.0 fL


(7.4-10.4)


 


Neutrophils (%) (Auto) 70.9 % 


 


Lymphocytes (%) (Auto) 12.7 % 


 


Monocytes (%) (Auto) 14.9 % 


 


Eosinophils (%) (Auto) 1.0 % 


 


Basophils (%) (Auto) 0.1 % 


 


Neutrophils # (Auto)


  6.46 K/uL


(1.4-6.5)


 


Lymphocytes # (Auto)


  1.16 K/uL


(1.2-3.4)


 


Monocytes # (Auto)


  1.36 K/uL


(0.11-0.59)


 


Eosinophils # (Auto)


  0.09 K/uL


(0-0.5)


 


Basophils # (Auto)


  0.01 K/uL


(0-0.2)


 


RDW Standard Deviation


  36.3 fL


(36.4-46.3)


 


RDW Coefficient of Variation


  11.6 %


(11.5-14.5)


 


Immature Granulocyte % (Auto) 0.4 % 


 


Immature Granulocyte # (Auto)


  0.04 K/uL


(0.00-0.02)


 


Prothrombin Time


  15.7 SECONDS


(9.0-12.0)


 


Prothromb Time International


Ratio 1.5 (0.9-1.1) 


 


 


Activated Partial


Thromboplast Time 36.9 SECONDS


(21.0-31.0)


 


Partial Thromboplastin Ratio 1.4 


 


Anion Gap


  6.0 mmol/L


(3-11)


 


Est Creatinine Clear Calc


Drug Dose 103.7 ml/min 


 


 


Estimated GFR (


American) 92.8 


 


 


Estimated GFR (Non-


American 80.0 


 


 


BUN/Creatinine Ratio 10.1 (10-20) 


 


Calcium Level


  8.5 mg/dl


(8.5-10.1)


 


Total Bilirubin


  0.7 mg/dl


(0.2-1)


 


Direct Bilirubin


  0.2 mg/dl


(0-0.2)


 


Aspartate Amino Transf


(AST/SGOT) 61 U/L (15-37) 


 


 


Alanine Aminotransferase


(ALT/SGPT) 64 U/L (12-78) 


 


 


Alkaline Phosphatase


  158 U/L


()


 


Troponin I


  < 0.015 ng/ml


(0-0.045)


 


Pro-B-Type Natriuretic Peptide


  22 pg/ml


(0-450)


 


Total Protein


  7.3 gm/dl


(6.4-8.2)


 


Albumin


  3.6 gm/dl


(3.4-5.0)


 


Lipase


  71 U/L


()





Laboratory results reviewed by me





Medications Administered











 Medications


  (Trade)  Dose


 Ordered  Sig/Jae


 Route  Start Time


 Stop Time Status Last Admin


Dose Admin


 


 Acetaminophen


  (Tylenol Tab)  650 mg  NOW  STAT


 PO  4/15/18 18:10


 4/15/18 18:12 DC 4/15/18 18:19


650 MG


 


 Morphine Sulfate


  (MoRPHine


 SULFATE INJ)  4 mg  NOW  STAT


 IV  4/15/18 18:10


 4/15/18 18:12 DC 4/15/18 18:19


4 MG


 


 Sodium Chloride  500 ml @ 


 500 mls/hr  Q1H STAT


 IV  4/15/18 18:14


 4/15/18 19:13 DC 4/15/18 18:19


500 MLS/HR











ECG Per My Interpretation


Indication:  SOB/dyspnea


Rate (beats per minute):  116


Rhythm:  sinus tachycardia


Findings:  T-wave inversion (Lead III), other (Normal intervals and axis. No 

other STS changes or TWI.)





ED Course


1806: The patient was evaluated in room C12B. A complete history and physical 

exam was performed.





1947: I reevaluated the patient. Discussed results and discharge instructions: 

he verbalized understanding and agreement. The patient is ready for discharge.





Medical Decision


Nursing notes reviewed. Ancillary studies and prior records reviewed. 





The patient is a 32 year old black male with a past medical history of 

pulmonary embolisms who presents to the ED with a cc of worsening swelling to 

his lower extremities beginning three months ago.





Differential diagnosis:


Etiologies such as infections, reactive airway disease, pneumonia, pneumothorax

, COPD, CHF, cardiac ischemia, pulmonary embolism, musculoskeletal, 

gastrointestinal, as well as others were entertained.





Patient was seen and evaluated the bedside.  Patient was complaining of some 

shortness of breath as well as some lower extremity swelling.  The patient has 

had an issues with chronic lower extremity swelling was recently seen and 

treated for cellulitis.  The patient of note is on his feet quite a lot.  On 

exam the patient does have some symmetric bilateral lower extremity swelling.  

Patient's lung sounds are clear.  The patient does have some mild tachycardia.  

Patient also does complain of some mild right-sided low back pain.  It is 

reproducible on exam.





Patient did blood work completed, EKG, troponin, BNP, chest x-ray.  Patient was 

also given IV fluids as well as pain control.  Patient's blood work is fairly 

unremarkable.  Patient does have some trace anemia.  Patient white blood cell 

count is normal.  Patient has a negative troponin and BNP is not elevated.  I 

believe this less likely ACS or failure.  Furthermore, I think if he had 

elevated right-sided heart pressures causing his lower extremity edema he will 

have an elevated BNP and his chest film might show some evidence of volume 

overload.  Believe there is an element of dehydration also.  Patient's INR was 

1.5.  Patient was told to make sure that he has it checked to ensure that this 

improves and that it is therapeutic.  The patient has had bilateral lower 

extremity Dopplers during his last 2 visits at which time they have been 

negative.  There is no asymmetry the patient does not have pain in the lower 

extremities.  Patient also has had a CT PE protocol completed within the last 3 

months which did not show any evidence of PE.  I believe based on the patient's 

history and physical exam that PE is less likely.  On his EKG is TW I in lead 

III is chronic and the patient does not show evidence of right-sided heart 

strain as there are not T-wave inversions in the anterior leads.  Patient's 

tachycardia did improve with pain control as well as fluids.  Upon reassessment 

the patient was not tachypneic, tachycardic, nor hypoxic.  I did discuss with 

the patient that he needs to wear compression stockings or even consider Ace 

wraps for his bilateral lower extremities and stay off his feet when he can.  

Patient was given strict follow-up, discharge, and return precautions.  All 

questions were answered.  Patient was deemed suitable for outpatient follow-up 

at this time.  Patient agreed with the plan of care and was safely discharged 

home.





Medication Reconcilliation


Current Medication List:  was personally reviewed by me





Blood Pressure Screening


Patient's blood pressure:  Normal blood pressure


Blood pressure disposition:  Did not require urgent referral





Impression





 Primary Impression:  


 Dehydration


 Additional Impression:  


 Bilateral lower extremity edema





Scribe Attestation


The scribe's documentation has been prepared under my direction and personally 

reviewed by me in its entirety. I confirm that the note above accurately 

reflects all work, treatment, procedures, and medical decision making performed 

by me.





Departure Information


Dispostion


Home / Self-Care





Referrals


Rashad Baeza M.D. (PCP)





Forms


HOME CARE DOCUMENTATION FORM,                                                 

               IMPORTANT VISIT INFORMATION, WORK / SCHOOL INSTRUCTIONS





Patient Instructions


ED Leg Swelling Bilateral, My [facility]





Additional Instructions





Please return to the emergency department if you have worsening or recurrent 

symptoms not amenable to at-home treatment.  Please call for a follow-up 

appointment with her primary care physician.  Please take your medications as 

prescribed.  If you have other concerns and/or complaints please feel free to 

also call your primary care physician's office or return the ED for further 

evaluation, management, and treatment.





You were found to have an elevated blood pressure today (>120 sytolic or >90 

diastolic). Per medicare guidelines, you need to follow up with this blood 

pressure screening with your Primary Care Physician (PCP). For a new PCP call 

[phone].





You received narcotic or benzodiazepene medication while in the emergency room 

today. This is an addictive medication that may cause drowziness as well as 

constipation. Do not drive, operate heavy machinery, or drink alcohol under the 

influence of this medication.





You may take 800 mg Ibuprofen every 6 hours as needed for pain/fever with food. 

You may take tylenol 1000 mg every 6 hours as needed for pain. You may take 

motrin and tylenol separately or at the same time. 





Take your medications as prescribed.  Your INR is 1.5 today.  Please follow-up 

to have your INR checked and discuss any changes that may need to be made with 

your prescriber.





Please return if you have worsening lower extremity swelling, shortness of 

breath, chest pains, or coughing of blood.





You have been examined and treated today on an emergency basis only. This is 

not a substitute for, or an effort to provide, complete comprehensive medical 

care. It is impossible to recognize and treat all injuries or illnesses in a 

single emergency department visit. It is therefore important that you follow up 

closely with [facility], your PCP, and/or your specialist(s). 

Call as soon as possible for an appointment.





Thank you for your time and consideration. I look forward to speaking with you 

again soon. Please don't hesitate to call us if you have any questions.





Problem Qualifiers

## 2018-04-15 NOTE — DIAGNOSTIC IMAGING REPORT
CHEST ONE VIEW PORTABLE



CLINICAL HISTORY: 32 years-old Male presenting with CHEST PAIN, swollen leg. 



TECHNIQUE: Portable upright AP view of the chest was obtained.



COMPARISON: 1/13/2018.



FINDINGS:

Cardiomediastinal silhouette normal. Lungs and pleural spaces clear. Osseous

structures normal. Upper abdomen normal.



IMPRESSION:

1.  No acute cardiopulmonary disease.







Electronically signed by:  Kurt Nam M.D.

4/15/2018 6:47 PM



Dictated Date/Time:  4/15/2018 6:46 PM